# Patient Record
Sex: MALE | Race: OTHER | URBAN - METROPOLITAN AREA
[De-identification: names, ages, dates, MRNs, and addresses within clinical notes are randomized per-mention and may not be internally consistent; named-entity substitution may affect disease eponyms.]

---

## 2024-10-28 ENCOUNTER — OFFICE VISIT (OUTPATIENT)
Age: 34
End: 2024-10-28

## 2024-10-28 VITALS
SYSTOLIC BLOOD PRESSURE: 112 MMHG | TEMPERATURE: 97 F | RESPIRATION RATE: 18 BRPM | WEIGHT: 181 LBS | HEART RATE: 56 BPM | HEIGHT: 67 IN | BODY MASS INDEX: 28.41 KG/M2 | DIASTOLIC BLOOD PRESSURE: 73 MMHG | OXYGEN SATURATION: 97 %

## 2024-10-28 DIAGNOSIS — M65.30 TRIGGER FINGER OF RIGHT HAND, UNSPECIFIED FINGER: ICD-10-CM

## 2024-10-28 DIAGNOSIS — G56.01 CARPAL TUNNEL SYNDROME OF RIGHT WRIST: Primary | ICD-10-CM

## 2024-10-28 PROCEDURE — 99213 OFFICE O/P EST LOW 20 MIN: CPT | Performed by: FAMILY MEDICINE

## 2024-10-28 RX ORDER — GABAPENTIN 100 MG/1
CAPSULE ORAL
Qty: 60 CAPSULE | Refills: 5 | Status: SHIPPED | OUTPATIENT
Start: 2024-10-28 | End: 2025-02-14

## 2024-10-28 NOTE — PROGRESS NOTES
HPI  Priyanka Barbosa is a 34 y.o. male being seen today for   Chief Complaint   Patient presents with    Arm Pain     \"Pt stated that he been having right arm pain for about 2 weeks\"   Follow up for this pt with trigger finger, hand, wrist arm pain. All on right.  He works with [employer].  He responded breifly to oral course of steroid and then in may had carpal tunnel injection.  he states that the carpal tunnel injection helped his pain for several months.  Pain recurred a few weeks ago.      History reviewed. No pertinent past medical history.      ROS  Patient states that he is feeling well. Denies complaints of chest pain, shortness of breath, swelling of legs, dizziness or weakness. he denies nausea, vomiting or diarrhea.        Current Outpatient Medications   Medication Sig    gabapentin (NEURONTIN) 100 MG capsule 1 or 2 twice daily prn for pain     No current facility-administered medications for this visit.       PE  /73 (Site: Left Upper Arm, Position: Sitting, Cuff Size: Medium Adult)   Pulse 56   Temp 97 °F (36.1 °C) (Temporal)   Resp 18   Ht 1.702 m (5' 7\")   Wt 82.1 kg (181 lb)   SpO2 97%   BMI 28.35 kg/m²      Alert and oriented with normal mood and affect. he is well developed and well nourished . Right extremity WWP with triggering of fingers.  Skin intact.     No results found for this visit on 10/28/24.      Assessment and Plan:       Diagnosis Orders   1. Carpal tunnel syndrome of right wrist  gabapentin (NEURONTIN) 100 MG capsule    [facility] - Charlie Mclain DO, Hand Surgery, [place] ([facility])      2. Trigger finger of right hand, unspecified finger          Carpal tunnel injection lasted not quite 6 months.  Advised pt he will be best served by referral to hand specialist as his symptoms are recurrent and he has trigger finger as well as carpal tunnel.  He understands.  Referral made.     After PARQ and consent signed, landmarks identified and right carpal tunnel

## 2024-10-28 NOTE — PROGRESS NOTES
Good rx coupon for gabapentin given to the patient    Discharge instructions reviewed with patient  via  ([employer] language services)    Medication list and understanding of medications reviewed with patient via  ([employer] language [facility])   .   OTC and herbal medications reviewed and added to med list if applicable  Barriers to adherence assessed.    Guidance given regarding new medications this visit, including reason for taking this medicine, and common side effects.     AVS given to patient. Explained to patient  via  ([employer] language [facility]). Patient expressed understanding via  ([employer] language [employer]).

## 2024-10-29 ENCOUNTER — TELEPHONE (OUTPATIENT)
Age: 34
End: 2024-10-29

## 2024-10-29 NOTE — TELEPHONE ENCOUNTER
Patient advised via  ([employer] language services) appointment with Hand doctor  Dr Tashi Mclain  11/4/24  3:30 pm [address] [facility] [address] [phone]

## 2024-11-08 ENCOUNTER — TELEPHONE (OUTPATIENT)
Age: 34
End: 2024-11-08

## 2024-11-08 NOTE — TELEPHONE ENCOUNTER
Spoke with patient via  ([facility] language services) patient states didn't go to hand surgeon appointment on 11/4/24 because he could not get the day off work